# Patient Record
Sex: MALE | Race: WHITE | Employment: UNEMPLOYED | URBAN - METROPOLITAN AREA
[De-identification: names, ages, dates, MRNs, and addresses within clinical notes are randomized per-mention and may not be internally consistent; named-entity substitution may affect disease eponyms.]

---

## 2024-02-07 ENCOUNTER — LAB ENCOUNTER (OUTPATIENT)
Dept: LAB | Age: 59
End: 2024-02-07
Attending: FAMILY MEDICINE
Payer: COMMERCIAL

## 2024-02-07 ENCOUNTER — EKG ENCOUNTER (OUTPATIENT)
Dept: LAB | Age: 59
End: 2024-02-07
Attending: FAMILY MEDICINE
Payer: COMMERCIAL

## 2024-02-07 ENCOUNTER — OFFICE VISIT (OUTPATIENT)
Dept: FAMILY MEDICINE CLINIC | Facility: CLINIC | Age: 59
End: 2024-02-07

## 2024-02-07 VITALS
DIASTOLIC BLOOD PRESSURE: 68 MMHG | SYSTOLIC BLOOD PRESSURE: 107 MMHG | HEIGHT: 65 IN | RESPIRATION RATE: 16 BRPM | HEART RATE: 74 BPM | BODY MASS INDEX: 22.56 KG/M2 | WEIGHT: 135.38 LBS

## 2024-02-07 DIAGNOSIS — Z11.3 SCREEN FOR STD (SEXUALLY TRANSMITTED DISEASE): ICD-10-CM

## 2024-02-07 DIAGNOSIS — Z00.00 PHYSICAL EXAM: ICD-10-CM

## 2024-02-07 DIAGNOSIS — Z12.11 COLON CANCER SCREENING: ICD-10-CM

## 2024-02-07 DIAGNOSIS — Z11.3 SCREENING EXAMINATION FOR VENEREAL DISEASE: Primary | ICD-10-CM

## 2024-02-07 DIAGNOSIS — F17.210 CIGARETTE NICOTINE DEPENDENCE WITHOUT COMPLICATION: ICD-10-CM

## 2024-02-07 DIAGNOSIS — Z00.00 PHYSICAL EXAM: Primary | ICD-10-CM

## 2024-02-07 LAB
ALBUMIN SERPL-MCNC: 4.8 G/DL (ref 3.2–4.8)
ALBUMIN/GLOB SERPL: 1.7 {RATIO} (ref 1–2)
ALP LIVER SERPL-CCNC: 80 U/L
ALT SERPL-CCNC: 17 U/L
ANION GAP SERPL CALC-SCNC: 3 MMOL/L (ref 0–18)
AST SERPL-CCNC: 21 U/L (ref ?–34)
ATRIAL RATE: 63 BPM
BASOPHILS # BLD AUTO: 0.06 X10(3) UL (ref 0–0.2)
BASOPHILS NFR BLD AUTO: 0.8 %
BILIRUB SERPL-MCNC: 0.4 MG/DL (ref 0.3–1.2)
BILIRUB UR QL: NEGATIVE
BUN BLD-MCNC: 9 MG/DL (ref 9–23)
BUN/CREAT SERPL: 10 (ref 10–20)
CALCIUM BLD-MCNC: 9.7 MG/DL (ref 8.7–10.4)
CHLORIDE SERPL-SCNC: 109 MMOL/L (ref 98–112)
CHOLEST SERPL-MCNC: 194 MG/DL (ref ?–200)
CLARITY UR: CLEAR
CO2 SERPL-SCNC: 29 MMOL/L (ref 21–32)
CREAT BLD-MCNC: 0.9 MG/DL
DEPRECATED RDW RBC AUTO: 42.3 FL (ref 35.1–46.3)
EGFRCR SERPLBLD CKD-EPI 2021: 100 ML/MIN/1.73M2 (ref 60–?)
EOSINOPHIL # BLD AUTO: 0.24 X10(3) UL (ref 0–0.7)
EOSINOPHIL NFR BLD AUTO: 3.1 %
ERYTHROCYTE [DISTWIDTH] IN BLOOD BY AUTOMATED COUNT: 12.8 % (ref 11–15)
EST. AVERAGE GLUCOSE BLD GHB EST-MCNC: 97 MG/DL (ref 68–126)
FASTING PATIENT LIPID ANSWER: YES
FASTING STATUS PATIENT QL REPORTED: YES
GLOBULIN PLAS-MCNC: 2.8 G/DL (ref 2.8–4.4)
GLUCOSE BLD-MCNC: 100 MG/DL (ref 70–99)
GLUCOSE UR-MCNC: NORMAL MG/DL
HBA1C MFR BLD: 5 % (ref ?–5.7)
HCT VFR BLD AUTO: 45.1 %
HCV AB SERPL QL IA: NONREACTIVE
HDLC SERPL-MCNC: 38 MG/DL (ref 40–59)
HGB BLD-MCNC: 15.5 G/DL
HGB UR QL STRIP.AUTO: NEGATIVE
IMM GRANULOCYTES # BLD AUTO: 0.03 X10(3) UL (ref 0–1)
IMM GRANULOCYTES NFR BLD: 0.4 %
KETONES UR-MCNC: NEGATIVE MG/DL
LDLC SERPL CALC-MCNC: 133 MG/DL (ref ?–100)
LEUKOCYTE ESTERASE UR QL STRIP.AUTO: NEGATIVE
LYMPHOCYTES # BLD AUTO: 2.59 X10(3) UL (ref 1–4)
LYMPHOCYTES NFR BLD AUTO: 33.5 %
MCH RBC QN AUTO: 31.1 PG (ref 26–34)
MCHC RBC AUTO-ENTMCNC: 34.4 G/DL (ref 31–37)
MCV RBC AUTO: 90.4 FL
MONOCYTES # BLD AUTO: 0.6 X10(3) UL (ref 0.1–1)
MONOCYTES NFR BLD AUTO: 7.8 %
NEUTROPHILS # BLD AUTO: 4.22 X10 (3) UL (ref 1.5–7.7)
NEUTROPHILS # BLD AUTO: 4.22 X10(3) UL (ref 1.5–7.7)
NEUTROPHILS NFR BLD AUTO: 54.4 %
NITRITE UR QL STRIP.AUTO: NEGATIVE
NONHDLC SERPL-MCNC: 156 MG/DL (ref ?–130)
OSMOLALITY SERPL CALC.SUM OF ELEC: 291 MOSM/KG (ref 275–295)
P AXIS: 81 DEGREES
P-R INTERVAL: 128 MS
PH UR: 6.5 [PH] (ref 5–8)
PLATELET # BLD AUTO: 267 10(3)UL (ref 150–450)
POTASSIUM SERPL-SCNC: 4.4 MMOL/L (ref 3.5–5.1)
PROT SERPL-MCNC: 7.6 G/DL (ref 5.7–8.2)
PROT UR-MCNC: NEGATIVE MG/DL
PSA SERPL-MCNC: 1.05 NG/ML (ref ?–4)
Q-T INTERVAL: 418 MS
QRS DURATION: 100 MS
QTC CALCULATION (BEZET): 427 MS
R AXIS: 86 DEGREES
RBC # BLD AUTO: 4.99 X10(6)UL
SODIUM SERPL-SCNC: 141 MMOL/L (ref 136–145)
SP GR UR STRIP: 1.01 (ref 1–1.03)
T AXIS: 76 DEGREES
T PALLIDUM AB SER QL IA: NONREACTIVE
TRIGL SERPL-MCNC: 126 MG/DL (ref 30–149)
TSI SER-ACNC: 0.81 MIU/ML (ref 0.55–4.78)
UROBILINOGEN UR STRIP-ACNC: NORMAL
VENTRICULAR RATE: 63 BPM
VIT D+METAB SERPL-MCNC: 11.4 NG/ML (ref 30–100)
VLDLC SERPL CALC-MCNC: 23 MG/DL (ref 0–30)
WBC # BLD AUTO: 7.7 X10(3) UL (ref 4–11)

## 2024-02-07 PROCEDURE — 93010 ELECTROCARDIOGRAM REPORT: CPT | Performed by: INTERNAL MEDICINE

## 2024-02-07 PROCEDURE — 86696 HERPES SIMPLEX TYPE 2 TEST: CPT

## 2024-02-07 PROCEDURE — 87389 HIV-1 AG W/HIV-1&-2 AB AG IA: CPT

## 2024-02-07 PROCEDURE — 86780 TREPONEMA PALLIDUM: CPT

## 2024-02-07 PROCEDURE — 90471 IMMUNIZATION ADMIN: CPT | Performed by: FAMILY MEDICINE

## 2024-02-07 PROCEDURE — 87591 N.GONORRHOEAE DNA AMP PROB: CPT | Performed by: FAMILY MEDICINE

## 2024-02-07 PROCEDURE — 86803 HEPATITIS C AB TEST: CPT

## 2024-02-07 PROCEDURE — 84153 ASSAY OF PSA TOTAL: CPT

## 2024-02-07 PROCEDURE — 36415 COLL VENOUS BLD VENIPUNCTURE: CPT

## 2024-02-07 PROCEDURE — 90686 IIV4 VACC NO PRSV 0.5 ML IM: CPT | Performed by: FAMILY MEDICINE

## 2024-02-07 PROCEDURE — 87491 CHLMYD TRACH DNA AMP PROBE: CPT | Performed by: FAMILY MEDICINE

## 2024-02-07 PROCEDURE — 85025 COMPLETE CBC W/AUTO DIFF WBC: CPT

## 2024-02-07 PROCEDURE — 86695 HERPES SIMPLEX TYPE 1 TEST: CPT

## 2024-02-07 PROCEDURE — 90472 IMMUNIZATION ADMIN EACH ADD: CPT | Performed by: FAMILY MEDICINE

## 2024-02-07 PROCEDURE — 90715 TDAP VACCINE 7 YRS/> IM: CPT | Performed by: FAMILY MEDICINE

## 2024-02-07 PROCEDURE — 81003 URINALYSIS AUTO W/O SCOPE: CPT

## 2024-02-07 PROCEDURE — 83036 HEMOGLOBIN GLYCOSYLATED A1C: CPT

## 2024-02-07 PROCEDURE — 99386 PREV VISIT NEW AGE 40-64: CPT | Performed by: FAMILY MEDICINE

## 2024-02-07 PROCEDURE — 80061 LIPID PANEL: CPT

## 2024-02-07 PROCEDURE — 80053 COMPREHEN METABOLIC PANEL: CPT

## 2024-02-07 PROCEDURE — 84443 ASSAY THYROID STIM HORMONE: CPT

## 2024-02-07 PROCEDURE — 99203 OFFICE O/P NEW LOW 30 MIN: CPT | Performed by: FAMILY MEDICINE

## 2024-02-07 PROCEDURE — 93005 ELECTROCARDIOGRAM TRACING: CPT

## 2024-02-07 PROCEDURE — 82306 VITAMIN D 25 HYDROXY: CPT

## 2024-02-07 NOTE — PROGRESS NOTES
2/7/2024  11:42 AM    Manan Zhu is a 58 year old male.    Chief complaint(s):   Chief Complaint   Patient presents with    Routine Physical     HPI:     Manan Zhu primary complaint is regarding CPE.     Manan Zhu is a 58 year old male is here for routine periodic health screening and examination.  His last physical exam was 18 years ago.  His last ECG was none . His last diabetes screening test was 18 months ago and was normal.   His last cholesterol test was 1 year ago and was normal, high .  Last dentist visit was many months ago.  He is not current with his Td immunization.  He is not current with his Flu vaccination and is interested in receiving it today.  Patient last colonoscopy was none. He is a smoker.        HISTORY:  Past Medical History:   Diagnosis Date    Hyperlipidemia       History reviewed. No pertinent surgical history.   Family History   Problem Relation Age of Onset    Hypertension Mother     Hypertension Sister     Hypertension Brother       Social History:   Social History     Socioeconomic History    Marital status:    Tobacco Use    Smoking status: Every Day     Types: Cigarettes     Passive exposure: Never    Smokeless tobacco: Never   Vaping Use    Vaping Use: Never used   Substance and Sexual Activity    Alcohol use: Yes    Drug use: Never        Immunizations:   Immunization History   Administered Date(s) Administered    Covid-19 Vaccine Pfizer 30 mcg/0.3 ml 04/28/2021, 05/19/2021   Pended Date(s) Pended    FLUZONE 6 months and older PFS 0.5 ml (24895) 02/07/2024    TDAP 02/07/2024       Medications (Active prior to today's visit):  No current outpatient medications on file.       Allergies:  No Known Allergies      ROS:   Review of Systems   Constitutional:  Negative for appetite change, fatigue and fever.   HENT:  Negative for hearing loss and nosebleeds.    Eyes:  Negative for pain and visual disturbance.   Respiratory:  Negative for apnea and shortness of breath.     Cardiovascular:  Negative for chest pain, palpitations and leg swelling.   Gastrointestinal:  Negative for abdominal pain, blood in stool, constipation, diarrhea, nausea and vomiting.   Endocrine: Negative for polydipsia and polyuria.   Genitourinary:  Negative for decreased urine volume, frequency and hematuria.        No nocturia   Musculoskeletal:  Negative for arthralgias.   Skin:  Negative for rash.   Neurological:  Negative for dizziness, syncope and headaches.   Psychiatric/Behavioral:  Negative for dysphoric mood and sleep disturbance.        PHYSICAL EXAM:   VS: /68   Pulse 74   Resp 16   Ht 5' 5\" (1.651 m)   Wt 135 lb 6 oz (61.4 kg)   BMI 22.53 kg/m²     Physical Exam  Vitals reviewed.   Constitutional:       Appearance: Normal appearance.      Comments:      HENT:      Head: Normocephalic.      Right Ear: Hearing, tympanic membrane and ear canal normal.      Left Ear: Hearing, tympanic membrane and ear canal normal.      Nose: Nose normal. No rhinorrhea.      Mouth/Throat:      Mouth: Mucous membranes are moist.      Pharynx: Oropharynx is clear.   Eyes:      Extraocular Movements: Extraocular movements intact.      Conjunctiva/sclera: Conjunctivae normal.      Pupils: Pupils are equal, round, and reactive to light.   Neck:      Thyroid: No thyroid mass.      Vascular: No carotid bruit.   Cardiovascular:      Rate and Rhythm: Normal rate and regular rhythm.      Heart sounds: Normal heart sounds, S1 normal and S2 normal. No murmur heard.  Pulmonary:      Effort: Pulmonary effort is normal.      Breath sounds: Normal breath sounds.   Abdominal:      General: Abdomen is flat. Bowel sounds are normal.      Palpations: Abdomen is soft. There is no hepatomegaly, splenomegaly or mass.      Tenderness: There is no abdominal tenderness.      Hernia: No hernia is present.   Musculoskeletal:      Cervical back: Neck supple.      Comments: Spinal exam without scoliosis.      Lymphadenopathy:       Cervical: No cervical adenopathy.   Skin:     General: Skin is warm.      Findings: No rash.   Neurological:      General: No focal deficit present.      Mental Status: He is alert.      Deep Tendon Reflexes:      Reflex Scores:       Patellar reflexes are 2+ on the right side and 2+ on the left side.  Psychiatric:         Attention and Perception: Attention normal.         Mood and Affect: Mood and affect normal.         LABORATORY RESULTS:       EKG / Spirometry : -     Radiology: No results found.     ASSESSMENT/PLAN:   Assessment   Encounter Diagnoses   Name Primary?    Physical exam Yes    Colon cancer screening     Cigarette nicotine dependence without complication     Screen for STD (sexually transmitted disease)        1. Physical exam  2. Colon cancer screening      CPE PLAN:    LABS / TEST & ORDERS for today's visit :Blood test(s) ordered today for send out to [facility] lab:  Orders Placed This Encounter   Procedures    CBC With Differential With Platelet    Comp Metabolic Panel (14)    Hemoglobin A1C    Lipid Panel    PSA, Total W Reflex To Free    TSH W Reflex To Free T4    Vitamin D    Urinalysis with Culture Reflex    HSV 1 & 2 Glycoprotein Specific AB,IGG    HEPATITIS C AB TEST    HIV Ag/Ab Combo    T Pallidum Screening [place]    TETANUS, DIPHTHERIA TOXOIDS AND ACELLULAR PERTUSIS VACCINE (TDAP), >7 YEARS, IM USE    Fluzone Quadrivalent 6mo+ 0.5mL    Chlamydia/Gc Amplification   Referrals: TETANUS, DIPHTHERIA TOXOIDS AND ACELLULAR PERTUSIS VACCINE (TDAP), >7 YEARS, IM USE  INFLUENZA VAC, QUAD, PRSV FREE, 0.5 ML  GASTRO - INTERNAL  EKG 12-LEAD  In-House; Urine dip.  Test/Procedures done today include: EKG.    IMMUNIZATIONS: Flu + Tdap, given today.    RECOMMENDATIONS given include: ANTICIPATORY GUIDANCE  topics covered today include: safety (i.e. seat belts, helmets, sunscreen, protective sports gear ), nutrition (i.e. healthy meals and snacks (i.e. avoid junk food and high-carbohydrate foods);  athletic conditioning, fluids; low fat milk, limit to less than 20 oz. a day; dental care ), and Healthy habits& Social competence & Responsibilities: Recommendations on physical activity; exercise daily or at least 3 times a week for 30-60 minutes doing cardiovascular exercise. Encourage to maintain the best physical and dental hygiene possible.     FOLLOW-UP: Schedule a follow-up visit in 12 months.     3. Cigarette nicotine dependence without complication  Stop smoking, declined Tx    4. Screen for STD (sexually transmitted disease)  Lab:   - HSV 1 & 2 Glycoprotein Specific AB,IGG; Future  - Chlamydia/Gc Amplification  - HEPATITIS C AB TEST; Future  - HIV Ag/Ab Combo; Future  - T Pallidum Screening [place]; Future   RTC pending results       Orders This Visit:  Orders Placed This Encounter   Procedures    CBC With Differential With Platelet    Comp Metabolic Panel (14)    Hemoglobin A1C    Lipid Panel    PSA, Total W Reflex To Free    TSH W Reflex To Free T4    Vitamin D    Urinalysis with Culture Reflex    HSV 1 & 2 Glycoprotein Specific AB,IGG    HEPATITIS C AB TEST    HIV Ag/Ab Combo    T Pallidum Screening [place]    TETANUS, DIPHTHERIA TOXOIDS AND ACELLULAR PERTUSIS VACCINE (TDAP), >7 YEARS, IM USE    Fluzone Quadrivalent 6mo+ 0.5mL    Chlamydia/Gc Amplification       Meds This Visit:  Requested Prescriptions      No prescriptions requested or ordered in this encounter       Imaging & Referrals:  TETANUS, DIPHTHERIA TOXOIDS AND ACELLULAR PERTUSIS VACCINE (TDAP), >7 YEARS, IM USE  INFLUENZA VAC, QUAD, PRSV FREE, 0.5 ML  GASTRO - INTERNAL  EKG 12-LEAD         RONNELL TIPTON MD

## 2024-02-08 LAB
C TRACH DNA SPEC QL NAA+PROBE: NEGATIVE
N GONORRHOEA DNA SPEC QL NAA+PROBE: NEGATIVE

## 2024-02-08 RX ORDER — ERGOCALCIFEROL 1.25 MG/1
50000 CAPSULE ORAL WEEKLY
Qty: 12 CAPSULE | Refills: 3 | Status: SHIPPED | OUTPATIENT
Start: 2024-02-08 | End: 2025-02-07

## 2024-02-08 NOTE — PROGRESS NOTES
Please notify patient that his/her blood test showed low levels of vitamin D. A prescription was sent to his pharmacy to take one capsule or tablet, once a week. This Rx is good for one year. We'll recheck levels in one year.    Normal CBC, CMP, Lipids, TSH, HIV, A1c, PSA

## 2024-02-09 LAB
HSV 1 GLYCOPROTEIN G, IGG: POSITIVE
HSV 2 GLYCOPROTEIN G, IGG: NEGATIVE

## 2024-02-15 ENCOUNTER — OFFICE VISIT (OUTPATIENT)
Dept: FAMILY MEDICINE CLINIC | Facility: CLINIC | Age: 59
End: 2024-02-15

## 2024-02-15 VITALS
SYSTOLIC BLOOD PRESSURE: 124 MMHG | HEART RATE: 67 BPM | WEIGHT: 139 LBS | DIASTOLIC BLOOD PRESSURE: 77 MMHG | BODY MASS INDEX: 23.16 KG/M2 | HEIGHT: 65 IN

## 2024-02-15 DIAGNOSIS — E55.9 VITAMIN D DEFICIENCY: ICD-10-CM

## 2024-02-15 DIAGNOSIS — B00.9 HSV (HERPES SIMPLEX VIRUS) INFECTION: Primary | ICD-10-CM

## 2024-02-15 PROCEDURE — 99213 OFFICE O/P EST LOW 20 MIN: CPT | Performed by: FAMILY MEDICINE

## 2024-02-15 RX ORDER — VALACYCLOVIR HYDROCHLORIDE 1 G/1
500 TABLET, FILM COATED ORAL 3 TIMES DAILY
Qty: 21 TABLET | Refills: 1 | Status: SHIPPED | OUTPATIENT
Start: 2024-02-15 | End: 2024-02-22

## 2024-02-15 NOTE — PROGRESS NOTES
2/15/2024  11:41 AM    Manan Zhu is a 58 year old male.    Chief complaint(s):   Chief Complaint   Patient presents with    Follow - Up     Discuss test results form 02/07/2024.     HPI:     Manan Zhu primary complaint is regarding + STD.     Patient is a 58-year-old male who is coming back after STD screening test done last week.  Positive for herpes simplex virus 1.  He does have a small skin lesion on his left upper lip.  Has never had any genitalia lesions.  This is the first time he is aware that he has herpes infection.  Also had a low vitamin D levels.      HISTORY:  Past Medical History:   Diagnosis Date    Hyperlipidemia       No past surgical history on file.   Family History   Problem Relation Age of Onset    Hypertension Mother     Hypertension Sister     Hypertension Brother       Social History:   Social History     Socioeconomic History    Marital status:    Tobacco Use    Smoking status: Every Day     Types: Cigarettes     Passive exposure: Never    Smokeless tobacco: Never   Vaping Use    Vaping Use: Never used   Substance and Sexual Activity    Alcohol use: Yes    Drug use: Never        Immunizations:   Immunization History   Administered Date(s) Administered    Covid-19 Vaccine Pfizer 30 mcg/0.3 ml 04/28/2021, 05/19/2021    FLUZONE 6 months and older PFS 0.5 ml (28575) 02/07/2024    TDAP 02/07/2024       Medications (Active prior to today's visit):  Current Outpatient Medications   Medication Sig Dispense Refill    valACYclovir 1 G Oral Tab Take 0.5 tablets (500 mg total) by mouth in the morning, at noon, and at bedtime for 7 days. 21 tablet 1    ergocalciferol 1.25 MG (91897 UT) Oral Cap Take 1 capsule (50,000 Units total) by mouth once a week. 12 capsule 3       Allergies:  No Known Allergies      ROS:   Review of Systems   Constitutional:  Negative for appetite change, fatigue and fever.   Respiratory:  Negative for shortness of breath.    Cardiovascular:  Negative for chest  pain.   Gastrointestinal:  Negative for abdominal pain.   Musculoskeletal:  Negative for myalgias.   Skin:  Negative for rash.   Neurological:  Negative for dizziness, weakness and headaches.       PHYSICAL EXAM:   VS: /77 (BP Location: Right arm, Patient Position: Sitting, Cuff Size: adult)   Pulse 67   Ht 5' 5\" (1.651 m)   Wt 139 lb (63 kg)   BMI 23.13 kg/m²     Physical Exam  Vitals reviewed.   Constitutional:       Appearance: Normal appearance. He is well-developed.   HENT:      Head: Normocephalic.   Eyes:      General: No scleral icterus.     Conjunctiva/sclera: Conjunctivae normal.   Cardiovascular:      Rate and Rhythm: Normal rate.   Pulmonary:      Effort: Pulmonary effort is normal.   Musculoskeletal:      Cervical back: Neck supple.   Skin:     Findings: No rash.   Psychiatric:         Mood and Affect: Mood normal.         LABORATORY RESULTS:   No results found for: \"URCOLOR\", \"URCLA\", \"URINELEUK\", \"URINENITRITE\", \"URINEBLOOD\"   Results for orders placed or performed in visit on 02/07/24   Comp Metabolic Panel (14)   Result Value Ref Range    Glucose 100 (H) 70 - 99 mg/dL    Sodium 141 136 - 145 mmol/L    Potassium 4.4 3.5 - 5.1 mmol/L    Chloride 109 98 - 112 mmol/L    CO2 29.0 21.0 - 32.0 mmol/L    Anion Gap 3 0 - 18 mmol/L    BUN 9 9 - 23 mg/dL    Creatinine 0.90 0.70 - 1.30 mg/dL    BUN/CREA Ratio 10.0 10.0 - 20.0    Calcium, Total 9.7 8.7 - 10.4 mg/dL    Calculated Osmolality 291 275 - 295 mOsm/kg    eGFR-Cr 100 >=60 mL/min/1.73m2    ALT 17 10 - 49 U/L    AST 21 <=34 U/L    Alkaline Phosphatase 80 45 - 117 U/L    Bilirubin, Total 0.4 0.3 - 1.2 mg/dL    Total Protein 7.6 5.7 - 8.2 g/dL    Albumin 4.8 3.2 - 4.8 g/dL    Globulin  2.8 2.8 - 4.4 g/dL    A/G Ratio 1.7 1.0 - 2.0    Patient Fasting for CMP? Yes    Hemoglobin A1C   Result Value Ref Range    HgbA1C 5.0 <5.7 %    Estimated Average Glucose 97 68 - 126 mg/dL   Lipid Panel   Result Value Ref Range    Cholesterol, Total 194 <200 mg/dL     HDL Cholesterol 38 (L) 40 - 59 mg/dL    Triglycerides 126 30 - 149 mg/dL    LDL Cholesterol 133 (H) <100 mg/dL    VLDL 23 0 - 30 mg/dL    Non HDL Chol 156 (H) <130 mg/dL    Patient Fasting for Lipid? Yes    PSA, Total W Reflex To Free   Result Value Ref Range    PSA 1.05 <=4.00 ng/mL   TSH W Reflex To Free T4   Result Value Ref Range    TSH 0.806 0.550 - 4.780 mIU/mL   Urinalysis with Culture Reflex    Specimen: Urine   Result Value Ref Range    Urine Color Light-Yellow Yellow    Clarity Urine Clear Clear    Spec Gravity 1.012 1.005 - 1.030    Glucose Urine Normal Normal mg/dL    Bilirubin Urine Negative Negative    Ketones Urine Negative Negative mg/dL    Blood Urine Negative Negative    pH Urine 6.5 5.0 - 8.0    Protein Urine Negative Negative mg/dL    Urobilinogen Urine Normal Normal    Nitrite Urine Negative Negative    Leukocyte Esterase Urine Negative Negative    Microscopic Microscopic not indicated    HSV 1 & 2 Glycoprotein Specific AB,IGG   Result Value Ref Range    HSV 1 Glycoprotein G, IgG Positive (A) Negative    HSV 2 Glycoprotein G, IgG Negative Negative   T Pallidum Screening [place]   Result Value Ref Range    Treponemal Antibodies Nonreactive Nonreactive    HCV Antibody   Result Value Ref Range    Hepatitis C Virus Nonreactive Nonreactive    Vitamin D, 25-Hydroxy   Result Value Ref Range    Vitamin D, 25OH, Total 11.4 (L) 30.0 - 100.0 ng/mL   HIV Ag/Ab Combo   Result Value Ref Range    HIV Antigen Antibody Combo Non-Reactive Non-Reactive   EKG 12 Lead   Result Value Ref Range    Ventricular rate 63 BPM    Atrial rate 63 BPM    P-R Interval 128 ms    QRS Duration 100 ms    Q-T Interval 418 ms    QTC Calculation (Bezet) 427 ms    P Axis 81 degrees    R Axis 86 degrees    T Axis 76 degrees   CBC W/ DIFFERENTIAL   Result Value Ref Range    WBC 7.7 4.0 - 11.0 x10(3) uL    RBC 4.99 4.30 - 5.70 x10(6)uL    HGB 15.5 13.0 - 17.5 g/dL    HCT 45.1 39.0 - 53.0 %    MCV 90.4 80.0 - 100.0 fL    MCH 31.1 26.0 -  34.0 pg    MCHC 34.4 31.0 - 37.0 g/dL    RDW-SD 42.3 35.1 - 46.3 fL    RDW 12.8 11.0 - 15.0 %    .0 150.0 - 450.0 10(3)uL    Neutrophil Absolute Prelim 4.22 1.50 - 7.70 x10 (3) uL    Neutrophil Absolute 4.22 1.50 - 7.70 x10(3) uL    Lymphocyte Absolute 2.59 1.00 - 4.00 x10(3) uL    Monocyte Absolute 0.60 0.10 - 1.00 x10(3) uL    Eosinophil Absolute 0.24 0.00 - 0.70 x10(3) uL    Basophil Absolute 0.06 0.00 - 0.20 x10(3) uL    Immature Granulocyte Absolute 0.03 0.00 - 1.00 x10(3) uL    Neutrophil % 54.4 %    Lymphocyte % 33.5 %    Monocyte % 7.8 %    Eosinophil % 3.1 %    Basophil % 0.8 %    Immature Granulocyte % 0.4 %       EKG / Spirometry : -     Radiology: No results found.     ASSESSMENT/PLAN:   Assessment   Encounter Diagnoses   Name Primary?    HSV (herpes simplex virus) infection Yes    Vitamin D deficiency        MEDICATIONS:   Requested Prescriptions     Signed Prescriptions Disp Refills    valACYclovir 1 G Oral Tab 21 tablet 1     Sig: Take 0.5 tablets (500 mg total) by mouth in the morning, at noon, and at bedtime for 7 days.   Vit D 50K po Q week    RECOMMENDATIONS given include: keep affected area clean and dry, especially between the toes, watch for signs of infection such as redness, swelling, or discharge, wear absorbent cotton socks, and air out shoes when not wearing them.    FOLLOW-UP: Schedule a follow-up visit in 3 months.          Orders This Visit:  No orders of the defined types were placed in this encounter.      Meds This Visit:  Requested Prescriptions     Signed Prescriptions Disp Refills    valACYclovir 1 G Oral Tab 21 tablet 1     Sig: Take 0.5 tablets (500 mg total) by mouth in the morning, at noon, and at bedtime for 7 days.       Imaging & Referrals:  None         RONNELL TIPTON MD